# Patient Record
Sex: FEMALE | Race: BLACK OR AFRICAN AMERICAN | NOT HISPANIC OR LATINO | Employment: UNEMPLOYED | URBAN - METROPOLITAN AREA
[De-identification: names, ages, dates, MRNs, and addresses within clinical notes are randomized per-mention and may not be internally consistent; named-entity substitution may affect disease eponyms.]

---

## 2022-07-18 ENCOUNTER — LAB REQUISITION (OUTPATIENT)
Dept: LAB | Facility: CLINIC | Age: 62
End: 2022-07-18

## 2022-07-18 PROCEDURE — 88342 IMHCHEM/IMCYTCHM 1ST ANTB: CPT | Mod: TC | Performed by: SPECIALIST

## 2022-07-20 ENCOUNTER — TRANSCRIBE ORDERS (OUTPATIENT)
Dept: OTHER | Age: 62
End: 2022-07-20

## 2022-07-20 ENCOUNTER — PATIENT OUTREACH (OUTPATIENT)
Dept: ONCOLOGY | Facility: CLINIC | Age: 62
End: 2022-07-20

## 2022-07-20 DIAGNOSIS — C55 UTERINE CARCINOSARCOMA (H): Primary | ICD-10-CM

## 2022-07-20 NOTE — PROGRESS NOTES
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 Cervix, 6:00, biopsy - Fragments of adenocarcinoma (see specimens B and D). Background    squamous mucosa with reactive changes.     B. Cervix, biopsy - Carcinosarcoma with heterologous elements. See comment.     C. Endocervix, curettage - Atypical glandular cells present (see specimens B and D).     D. Endometrium, biopsy - Carcinosarcoma with heterologous elements. See comment. -- BOOKMARKED    7/5 US showing:  IMPRESSION   Impression:   1. Thickened endometrium measuring up to 6.2 cm with pronounced heterogeneous cystic changes with conspicuous vascularity concerning for endometrial malignancy until proven otherwise.   2. Myomatous uterus including a 6.9 cm subserosal fibroid.   3. Complex fluid in the pelvic cul-de-sac.     Clinical Assessment / Barriers to Care (Per Nurse):  Pt lives in [place]. Per referring MD note, she has given consent to speak with her son/caregiver. Results/referral was discussed with her son.    Records Location: Care Everywhere     Records Needed:   Images/Path from [facility]    Additional testing needed prior to consult:   N/A    Referral updates and Plan:   Contacted patient and her son Mu answered. Introduced self and role as nurse navigator. Mu reports he is patient's main caregiver and is aware of results/referral and hoping for appointment ASAP to discuss results/next steps. [place] is the most convenient location for them.  Provided contact information if questions arise.  Writer will review with on-call provider and arrange soonest appointment.     Hold Dr. Ga 7/26 7AM (return) spot at [facility], IB to Sury confirming ok to use spot. Mu confirmed this appointment date/time will work for them. Will route to scheduling to arrange appointment pending confirmation from Dr. Ga.     Asia Dove, BSN, RN, PHN, OCN  Hematology/Oncology Nurse Navigator  [facility]  [phone]

## 2022-07-25 NOTE — PROGRESS NOTES
RECORDS STATUS - ALL OTHER DIAGNOSIS      RECORDS RECEIVED FROM: [facility]/ [facility]    DATE RECEIVED: 7/26/2022    Action    Action Taken 7/25/2022 8:37AM KEB   I faxed over a request for imaging to [facility].     I faxed over a request and [employer] shipping label for bx slides to [facility]    10AM KEB   I called [facility]'s IMG Dept [phone]- unavailable. I resolved imaging in PACS      NOTES STATUS DETAILS   OFFICE NOTE from referring provider Complete Epic   Lissy Shearer MD   DISCHARGE SUMMARY from hospital     DISCHARGE REPORT from the ER     OPERATIVE REPORT Complete See Biopsy Report in [facility]   CLINICAL TRIAL TREATMENTS TO DATE     LABS     PATHOLOGY REPORTS Requested- [facility]   [employer] Tracking Number: [id number] 7/12/2022 [facility]  [id number]   A. Cervix, 6:00, biopsy - Fragments of adenocarcinoma (see specimens B and D). Background    squamous mucosa with reactive changes.     B. Cervix, biopsy - Carcinosarcoma with heterologous elements. See comment.     C. Endocervix, curettage - Atypical glandular cells present (see specimens B and D).     D. Endometrium, biopsy - Carcinosarcoma with heterologous elements. See comment.    ANYTHING RELATED TO DIAGNOSIS Complete Labs last updated on 6/23/2022    GENONOMIC TESTING     TYPE:     IMAGING (NEED IMAGES & REPORT)     CT SCANS     MRI     Xray Pelvis  Complete- [facility]  7/20/2022    MAMMO     ULTRASOUND COmplete- [facility]  ULT Pelvic 7/5/2022   PET

## 2022-07-26 ENCOUNTER — ONCOLOGY VISIT (OUTPATIENT)
Dept: ONCOLOGY | Facility: CLINIC | Age: 62
End: 2022-07-26
Attending: OBSTETRICS & GYNECOLOGY

## 2022-07-26 ENCOUNTER — PRE VISIT (OUTPATIENT)
Dept: ONCOLOGY | Facility: CLINIC | Age: 62
End: 2022-07-26

## 2022-07-26 ENCOUNTER — LAB (OUTPATIENT)
Dept: LAB | Facility: CLINIC | Age: 62
End: 2022-07-26

## 2022-07-26 VITALS
OXYGEN SATURATION: 98 % | BODY MASS INDEX: 32.42 KG/M2 | TEMPERATURE: 98.9 F | HEART RATE: 84 BPM | SYSTOLIC BLOOD PRESSURE: 125 MMHG | WEIGHT: 213.9 LBS | DIASTOLIC BLOOD PRESSURE: 74 MMHG | HEIGHT: 68 IN

## 2022-07-26 DIAGNOSIS — C55 UTERINE CARCINOSARCOMA (H): ICD-10-CM

## 2022-07-26 LAB
ABO/RH(D): NORMAL
ALBUMIN SERPL-MCNC: 3.5 G/DL (ref 3.4–5)
ALP SERPL-CCNC: 94 U/L (ref 40–150)
ALT SERPL W P-5'-P-CCNC: 23 U/L (ref 0–50)
ANION GAP SERPL CALCULATED.3IONS-SCNC: 8 MMOL/L (ref 3–14)
ANTIBODY SCREEN: NEGATIVE
AST SERPL W P-5'-P-CCNC: 36 U/L (ref 0–45)
BASOPHILS # BLD AUTO: 0 10E3/UL (ref 0–0.2)
BASOPHILS NFR BLD AUTO: 1 %
BILIRUB SERPL-MCNC: 0.2 MG/DL (ref 0.2–1.3)
BUN SERPL-MCNC: 18 MG/DL (ref 7–30)
CALCIUM SERPL-MCNC: 10.4 MG/DL (ref 8.5–10.1)
CANCER AG125 SERPL-ACNC: 2583 U/ML
CHLORIDE BLD-SCNC: 99 MMOL/L (ref 94–109)
CO2 SERPL-SCNC: 25 MMOL/L (ref 20–32)
CREAT SERPL-MCNC: 1.24 MG/DL (ref 0.52–1.04)
EOSINOPHIL # BLD AUTO: 0.2 10E3/UL (ref 0–0.7)
EOSINOPHIL NFR BLD AUTO: 4 %
ERYTHROCYTE [DISTWIDTH] IN BLOOD BY AUTOMATED COUNT: 14.1 % (ref 10–15)
GFR SERPL CREATININE-BSD FRML MDRD: 50 ML/MIN/1.73M2
GLUCOSE BLD-MCNC: 88 MG/DL (ref 70–99)
HCT VFR BLD AUTO: 31.3 % (ref 35–47)
HGB BLD-MCNC: 10.5 G/DL (ref 11.7–15.7)
IMM GRANULOCYTES # BLD: 0 10E3/UL
IMM GRANULOCYTES NFR BLD: 0 %
LYMPHOCYTES # BLD AUTO: 1.8 10E3/UL (ref 0.8–5.3)
LYMPHOCYTES NFR BLD AUTO: 31 %
MCH RBC QN AUTO: 28.8 PG (ref 26.5–33)
MCHC RBC AUTO-ENTMCNC: 33.5 G/DL (ref 31.5–36.5)
MCV RBC AUTO: 86 FL (ref 78–100)
MONOCYTES # BLD AUTO: 0.5 10E3/UL (ref 0–1.3)
MONOCYTES NFR BLD AUTO: 8 %
NEUTROPHILS # BLD AUTO: 3.2 10E3/UL (ref 1.6–8.3)
NEUTROPHILS NFR BLD AUTO: 56 %
NRBC # BLD AUTO: 0 10E3/UL
NRBC BLD AUTO-RTO: 0 /100
PLATELET # BLD AUTO: 265 10E3/UL (ref 150–450)
POTASSIUM BLD-SCNC: 4.1 MMOL/L (ref 3.4–5.3)
PROT SERPL-MCNC: 7.7 G/DL (ref 6.8–8.8)
RBC # BLD AUTO: 3.65 10E6/UL (ref 3.8–5.2)
SODIUM SERPL-SCNC: 132 MMOL/L (ref 133–144)
SPECIMEN EXPIRATION DATE: NORMAL
WBC # BLD AUTO: 5.8 10E3/UL (ref 4–11)

## 2022-07-26 PROCEDURE — G0463 HOSPITAL OUTPT CLINIC VISIT: HCPCS

## 2022-07-26 PROCEDURE — 86850 RBC ANTIBODY SCREEN: CPT | Mod: 90 | Performed by: PATHOLOGY

## 2022-07-26 PROCEDURE — 85025 COMPLETE CBC W/AUTO DIFF WBC: CPT | Performed by: PATHOLOGY

## 2022-07-26 PROCEDURE — 86304 IMMUNOASSAY TUMOR CA 125: CPT | Mod: 90 | Performed by: PATHOLOGY

## 2022-07-26 PROCEDURE — 99000 SPECIMEN HANDLING OFFICE-LAB: CPT | Performed by: PATHOLOGY

## 2022-07-26 PROCEDURE — 80053 COMPREHEN METABOLIC PANEL: CPT | Performed by: PATHOLOGY

## 2022-07-26 PROCEDURE — 36415 COLL VENOUS BLD VENIPUNCTURE: CPT | Performed by: PATHOLOGY

## 2022-07-26 PROCEDURE — 86900 BLOOD TYPING SEROLOGIC ABO: CPT | Mod: 90 | Performed by: PATHOLOGY

## 2022-07-26 PROCEDURE — 99205 OFFICE O/P NEW HI 60 MIN: CPT | Performed by: OBSTETRICS & GYNECOLOGY

## 2022-07-26 PROCEDURE — 86901 BLOOD TYPING SEROLOGIC RH(D): CPT | Mod: 90 | Performed by: PATHOLOGY

## 2022-07-26 RX ORDER — MEDROXYPROGESTERONE ACETATE 10 MG
10 TABLET ORAL
COMMUNITY
Start: 2022-07-12

## 2022-07-26 RX ORDER — NIFEDIPINE 10 MG/1
10 CAPSULE ORAL
COMMUNITY
Start: 2022-06-23

## 2022-07-26 ASSESSMENT — PAIN SCALES - GENERAL: PAINLEVEL: MODERATE PAIN (4)

## 2022-07-26 NOTE — PROGRESS NOTES
Gynecologic Oncology New Patient Consult    Referring provider:    Lissy Shearer MD  [address] [address]   RE: Lissy Francis  : 1960  KAILEY: 2022      CC: Uterine Carcinosarcoma     HPI: Ms Lissy Francis is a 61 year old year old female with PMH of HTN who presents for consultation. She is here today with her son, who is interpreting.     She presented to the gynecologist for PMB that has been going on for 1-2 years, patient unsure. A TVUS ordered and pap smear obtained. The TVUS resulted significantly thickened EMS up to 6.2cm with cystic changes and increased vascularity concerning for malignancy per OSH notes.  Also noted 6.9cm subserosal fibroid . The pap smear resulted AGC favor malignancy.  Due to AGC on pap an office EMB, colposcopy with cervical biopsies and ECC to evaluate for endometrial and cervical malignancy. The patientw as then started on Provera BID for bleeding. Biopsies resulted carcinosarcoma. The patient was then referred to gyn oncology     GynHx:  LMP 7 years ago  Menopausal symptoms: some hot flashes at night time    She notes some abdominal pain, some rumbling, no difficulties with constipation, no bladder symptoms.  Does note urinary frequency.  Started on Provera, decreased bleeding but now increased with more bleeding and pain. Had noted some swelling in legs in the past,       PMH:  HTN, Carcinosarcoma    PSH: No appy.   x 7 (twins)    FH:  No cancer    SH:  Lives with son,wife and two kids (9 mo and 2.5 yrs).  Stairs at home.      Treatment History:  2020: TVUS. Uterus: measures 12.71 x 7.01 x 10.1 cm.  A few intramural and subserosal fibroids are appreciated, including a 6.9 cm subserosal fibroid along the right body segment. Intramural fibroid measuring up to 3.6 cm.   Endometrial stripe:  6.15 cm. Heterogeneous and pronounced cystic changes of the fundal portion of the endometrium. Pronounced intrinsic  vascularity is appreciated at the body segment of the endometrium.     7/12/22: EMB, Colpo, ECC:   No loss of nuclear expression of MMR proteins: low probability of microsatellite instability-high (MSI-H)*     A. Cervix, 6:00, biopsy - Fragments of adenocarcinoma (see specimens B and D). Background    squamous mucosa with reactive changes.     B. Cervix, biopsy - Carcinosarcoma with heterologous elements. See comment.     C. Endocervix, curettage - Atypical glandular cells present (see specimens B and D).     D. Endometrium, biopsy - Carcinosarcoma with heterologous elements. See comment.    No past medical history on file.    No past surgical history on file.     Social History:  Social History     Tobacco Use     Smoking status: Not on file     Smokeless tobacco: Not on file   Substance Use Topics     Alcohol use: Not on file     Family History:   The patient's family history is notable for :    Physical Exam:     There were no vitals taken for this visit.  There is no height or weight on file to calculate BMI.    General: Alert and oriented, no acute distress  Psych: Mood stable. Linear speech, appropriate affect  CV: RRR  Lungs: CTA  GI: No distention. No masses. No hernia.   Lymph: No enlarge lymph nodes in neck or groin  Abdomen slightly distended, mildly tender to palpation    Assessment:    Lissy Francis is a 61 year old woman with a new diagnosis of uterine carcinosarcoma.       60  minutes spent on the date of the encounter doing chart review, history and exam, documentation and further activities as noted above      Plan:     1.)   Uterine Carcinosarcoma:    -  ordered    - CT C/A/P     Reviewed diagnosis, implications of carcinosarcoma in detail with patient and her son.  Reviewed general recommendations for surgical treatment for purposes of treatment and staging.  Discussed aggressive histology, increased risk for extrauterine spread and need for adjuvant chemotherapy.  Discussed likelihood of  needing laparotomy approach due to size of uterus.    Risks, benefits and alternatives discussed in detail with patient.  Patient is visiting from [place] and is uncertain if she wants to stay for treatment.  At this time, she would like me to proceed with scheduling surgery.  In the meantime, patient will need to undergo staging CT scan, labs.  I will contact patient via her son with results and recs.    Patient's son had some financial concerns and I will work to have financial services reach out to him.     Questions answered, he expressed understanding of plan of care.     Mikki Ga MD  Gynecologic Oncology  [facility]

## 2022-07-26 NOTE — PATIENT INSTRUCTIONS
Labs  CT CAP    You will be contacted with a surgical date    Financial resources    Mikki Ga MD  Gynecologic Oncology  [facility]

## 2022-07-26 NOTE — TELEPHONE ENCOUNTER
Action July 26, 2022 4:38 PM ABT   Action Taken Slides from [facility] received and taken to 5th floor path lab for review

## 2022-07-26 NOTE — LETTER
2022         RE: Lissy Francis  [address] [facility] [address]        Dear Colleague,    Thank you for referring your patient, Lissy Francis, to the [facility]. Please see a copy of my visit note below.    Gynecologic Oncology New Patient Consult    Referring provider:    Lissy Shearer MD  [place] [id number] [place] [id number]   RE: Lissy Francis  : 1960  KAILEY: 2022      CC: Uterine Carcinosarcoma     HPI: Ms Lissy Francis is a 61 year old year old female with PMH of HTN who presents for consultation. She is here today with her son, who is interpreting.     She presented to the gynecologist for PMB that has been going on for 1-2 years, patient unsure. A TVUS ordered and pap smear obtained. The TVUS resulted significantly thickened EMS up to 6.2cm with cystic changes and increased vascularity concerning for malignancy per OSH notes.  Also noted 6.9cm subserosal fibroid . The pap smear resulted AGC favor malignancy.  Due to AGC on pap an office EMB, colposcopy with cervical biopsies and ECC to evaluate for endometrial and cervical malignancy. The patientw as then started on Provera BID for bleeding. Biopsies resulted carcinosarcoma. The patient was then referred to gyn oncology     GynHx:  LMP 7 years ago  Menopausal symptoms: some hot flashes at night time    She notes some abdominal pain, some rumbling, no difficulties with constipation, no bladder symptoms.  Does note urinary frequency.  Started on Provera, decreased bleeding but now increased with more bleeding and pain. Had noted some swelling in legs in the past,       PMH:  HTN, Carcinosarcoma    PSH: No appy.   x 7 (twins)    FH:  No cancer    SH:  Lives with son,wife and two kids (9 mo and 2.5 yrs).  Stairs at home.      Treatment History:  2020: TVUS. Uterus: measures 12.71 x 7.01 x 10.1 cm.  A few intramural and subserosal fibroids are appreciated,  including a 6.9 cm subserosal fibroid along the right body segment. Intramural fibroid measuring up to 3.6 cm.   Endometrial stripe:  6.15 cm. Heterogeneous and pronounced cystic changes of the fundal portion of the endometrium. Pronounced intrinsic vascularity is appreciated at the body segment of the endometrium.     7/12/22: EMB, Colpo, ECC:   No loss of nuclear expression of MMR proteins: low probability of microsatellite instability-high (MSI-H)*     A. Cervix, 6:00, biopsy - Fragments of adenocarcinoma (see specimens B and D). Background    squamous mucosa with reactive changes.     B. Cervix, biopsy - Carcinosarcoma with heterologous elements. See comment.     C. Endocervix, curettage - Atypical glandular cells present (see specimens B and D).     D. Endometrium, biopsy - Carcinosarcoma with heterologous elements. See comment.    No past medical history on file.    No past surgical history on file.     Social History:  Social History     Tobacco Use     Smoking status: Not on file     Smokeless tobacco: Not on file   Substance Use Topics     Alcohol use: Not on file     Family History:   The patient's family history is notable for :    Physical Exam:     There were no vitals taken for this visit.  There is no height or weight on file to calculate BMI.    General: Alert and oriented, no acute distress  Psych: Mood stable. Linear speech, appropriate affect  CV: RRR  Lungs: CTA  GI: No distention. No masses. No hernia.   Lymph: No enlarge lymph nodes in neck or groin  Abdomen slightly distended, mildly tender to palpation    Assessment:    Lissy Francis is a 61 year old woman with a new diagnosis of uterine carcinosarcoma.       60  minutes spent on the date of the encounter doing chart review, history and exam, documentation and further activities as noted above      Plan:     1.)   Uterine Carcinosarcoma:    -  ordered    - CT C/A/P     Reviewed diagnosis, implications of carcinosarcoma in detail with  patient and her son.  Reviewed general recommendations for surgical treatment for purposes of treatment and staging.  Discussed aggressive histology, increased risk for extrauterine spread and need for adjuvant chemotherapy.  Discussed likelihood of needing laparotomy approach due to size of uterus.    Risks, benefits and alternatives discussed in detail with patient.  Patient is visiting from [facility] and is uncertain if she wants to stay for treatment.  At this time, she would like me to proceed with scheduling surgery.  In the meantime, patient will need to undergo staging CT scan, labs.  I will contact patient via her son with results and recs.    Patient's son had some financial concerns and I will work to have financial services reach out to him.     Questions answered, he expressed understanding of plan of care.     Mikki Ga MD  Gynecologic Oncology  [facility]

## 2022-07-26 NOTE — NURSING NOTE
"Oncology Rooming Note    July 26, 2022 3:26 PM   Lissy Francis is a 61 year old female who presents for:    Chief Complaint   Patient presents with     Oncology Clinic Visit     Uterine carcinosaroma     Initial Vitals: /74   Pulse 84   Temp 98.9  F (37.2  C) (Oral)   Ht 1.739 m (5' 8.47\")   Wt 97 kg (213 lb 14.4 oz)   SpO2 98%   BMI 32.08 kg/m   Estimated body mass index is 32.08 kg/m  as calculated from the following:    Height as of this encounter: 1.739 m (5' 8.47\").    Weight as of this encounter: 97 kg (213 lb 14.4 oz). Body surface area is 2.16 meters squared.  Moderate Pain (4) Comment: Data Unavailable   No LMP recorded. Patient is postmenopausal.  Allergies reviewed: Yes  Medications reviewed: Yes    Medications: Medication refills not needed today.  Pharmacy name entered into [employer]: [id number] - [place], [place] - [address] [place] [address] AVE     Clinical concerns: none       Gris Mathis"

## 2022-07-28 ENCOUNTER — LAB REQUISITION (OUTPATIENT)
Dept: LAB | Facility: CLINIC | Age: 62
End: 2022-07-28

## 2022-07-28 LAB
PATH REPORT.COMMENTS IMP SPEC: ABNORMAL
PATH REPORT.COMMENTS IMP SPEC: YES
PATH REPORT.FINAL DX SPEC: ABNORMAL
PATH REPORT.GROSS SPEC: ABNORMAL
PATH REPORT.MICROSCOPIC SPEC OTHER STN: ABNORMAL
PATH REPORT.MICROSCOPIC SPEC OTHER STN: ABNORMAL
PATH REPORT.RELEVANT HX SPEC: ABNORMAL
PATH REPORT.RELEVANT HX SPEC: ABNORMAL
PATH REPORT.SITE OF ORIGIN SPEC: ABNORMAL

## 2022-07-28 PROCEDURE — 88321 CONSLTJ&REPRT SLD PREP ELSWR: CPT | Mod: GC | Performed by: PATHOLOGY

## 2022-07-29 ENCOUNTER — ANCILLARY PROCEDURE (OUTPATIENT)
Dept: CT IMAGING | Facility: CLINIC | Age: 62
End: 2022-07-29
Attending: OBSTETRICS & GYNECOLOGY

## 2022-07-29 DIAGNOSIS — C55 UTERINE CARCINOSARCOMA (H): ICD-10-CM

## 2022-07-29 PROCEDURE — 71260 CT THORAX DX C+: CPT | Mod: GC | Performed by: RADIOLOGY

## 2022-07-29 PROCEDURE — 74177 CT ABD & PELVIS W/CONTRAST: CPT | Mod: GC | Performed by: RADIOLOGY

## 2022-07-29 RX ORDER — IOPAMIDOL 755 MG/ML
118 INJECTION, SOLUTION INTRAVASCULAR ONCE
Status: COMPLETED | OUTPATIENT
Start: 2022-07-29 | End: 2022-07-29

## 2022-07-29 RX ADMIN — IOPAMIDOL 118 ML: 755 INJECTION, SOLUTION INTRAVASCULAR at 07:32

## 2022-07-30 ENCOUNTER — TELEPHONE (OUTPATIENT)
Dept: ONCOLOGY | Facility: CLINIC | Age: 62
End: 2022-07-30

## 2022-07-30 DIAGNOSIS — C80.1 CARCINOSARCOMA (H): Primary | ICD-10-CM

## 2022-07-30 RX ORDER — HEPARIN SODIUM,PORCINE 10 UNIT/ML
5 VIAL (ML) INTRAVENOUS
Status: CANCELLED | OUTPATIENT
Start: 2022-08-08

## 2022-07-30 RX ORDER — DIPHENHYDRAMINE HYDROCHLORIDE 50 MG/ML
50 INJECTION INTRAMUSCULAR; INTRAVENOUS
Status: CANCELLED
Start: 2022-08-08

## 2022-07-30 RX ORDER — ALBUTEROL SULFATE 90 UG/1
1-2 AEROSOL, METERED RESPIRATORY (INHALATION)
Status: CANCELLED
Start: 2022-08-08

## 2022-07-30 RX ORDER — DIPHENHYDRAMINE HCL 50 MG
50 CAPSULE ORAL ONCE
Status: CANCELLED
Start: 2022-08-08

## 2022-07-30 RX ORDER — EPINEPHRINE 1 MG/ML
0.3 INJECTION, SOLUTION, CONCENTRATE INTRAVENOUS EVERY 5 MIN PRN
Status: CANCELLED | OUTPATIENT
Start: 2022-08-08

## 2022-07-30 RX ORDER — HEPARIN SODIUM (PORCINE) LOCK FLUSH IV SOLN 100 UNIT/ML 100 UNIT/ML
5 SOLUTION INTRAVENOUS
Status: CANCELLED | OUTPATIENT
Start: 2022-08-08

## 2022-07-30 RX ORDER — LORAZEPAM 2 MG/ML
0.5 INJECTION INTRAMUSCULAR EVERY 4 HOURS PRN
Status: CANCELLED | OUTPATIENT
Start: 2022-08-08

## 2022-07-30 RX ORDER — PALONOSETRON 0.05 MG/ML
0.25 INJECTION, SOLUTION INTRAVENOUS ONCE
Status: CANCELLED | OUTPATIENT
Start: 2022-08-08

## 2022-07-30 RX ORDER — MEPERIDINE HYDROCHLORIDE 25 MG/ML
25 INJECTION INTRAMUSCULAR; INTRAVENOUS; SUBCUTANEOUS EVERY 30 MIN PRN
Status: CANCELLED | OUTPATIENT
Start: 2022-08-08

## 2022-07-30 RX ORDER — ALBUTEROL SULFATE 0.83 MG/ML
2.5 SOLUTION RESPIRATORY (INHALATION)
Status: CANCELLED | OUTPATIENT
Start: 2022-08-08

## 2022-07-31 NOTE — TELEPHONE ENCOUNTER
Contacted patient's son, Ronan, with path review confirming carcinosarcoma as well as CT scan showing widely metastatic, Stage IV disease.  Discussed aggressive histology, advanced staged, poor prognosis, disease not curable but treatable.    Do not recommend surgery at this time given wide spread disease.      Advise palliative chemotherapy.    Recommend: Carboplatin AUC 6 and Taxol 175mg/m2 IV every three weeks x at least six cycles.  Plan mid treatment CT scan.        Discussed chemotherapy in detail including drugs used, administration, port placement, toxcitites, anticipated side-effects including fatigue, alopecia, neuropathy, nausea, hematologic toxicities (anemia/neutropenia), changes in kidney function, allergic reaction, anaphylaxis, infection risk, sepsis.  Discussed treatment schedule, monitoring during and after chemotherapy.  Discussed potential need for blood transfusion, IV hydration, growth factor support.  Will arrange patient education.  Chemo through port        Schedulin.  Caris testing on omental biopsy  2.  Chemotherapy teaching  3.  Port placement  4.  Prechemo labs, NP visit and infusion center appt for Cycle #1 Carboplatin and Paclitaxel     Mikki Ga MD  Gynecologic Oncology  [facility]

## 2022-08-01 ENCOUNTER — PATIENT OUTREACH (OUTPATIENT)
Dept: ONCOLOGY | Facility: CLINIC | Age: 62
End: 2022-08-01

## 2022-08-01 NOTE — PROGRESS NOTES
Patient son reached out.    Patient has decided to return to [place] tomorrow. She would prefer not to schedule anything at this time.     The son states he tried to get her to stay but patient was not having it.     Son/patient will reach out to the team when or if patient returns to the [place]    Patient was very appreciative of RN/MD time and care    Venita Jones RN

## 2022-08-01 NOTE — PROGRESS NOTES
RN reached out to patients son to review any questions and walk through next steps.     Son/patient unavailable. Voicemail with contact information left    Venita Jones RN'

## 2022-10-03 ENCOUNTER — HEALTH MAINTENANCE LETTER (OUTPATIENT)
Age: 62
End: 2022-10-03

## 2023-10-22 ENCOUNTER — HEALTH MAINTENANCE LETTER (OUTPATIENT)
Age: 63
End: 2023-10-22

## 2024-10-06 ENCOUNTER — HEALTH MAINTENANCE LETTER (OUTPATIENT)
Age: 64
End: 2024-10-06

## 2024-12-15 ENCOUNTER — HEALTH MAINTENANCE LETTER (OUTPATIENT)
Age: 64
End: 2024-12-15